# Patient Record
Sex: FEMALE | HISPANIC OR LATINO
[De-identification: names, ages, dates, MRNs, and addresses within clinical notes are randomized per-mention and may not be internally consistent; named-entity substitution may affect disease eponyms.]

---

## 2023-05-17 PROBLEM — Z00.00 ENCOUNTER FOR PREVENTIVE HEALTH EXAMINATION: Status: ACTIVE | Noted: 2023-05-17

## 2023-05-19 ENCOUNTER — APPOINTMENT (OUTPATIENT)
Dept: INTERNAL MEDICINE | Facility: CLINIC | Age: 53
End: 2023-05-19

## 2023-07-27 ENCOUNTER — NON-APPOINTMENT (OUTPATIENT)
Age: 53
End: 2023-07-27

## 2023-07-28 ENCOUNTER — NON-APPOINTMENT (OUTPATIENT)
Age: 53
End: 2023-07-28

## 2023-07-28 ENCOUNTER — APPOINTMENT (OUTPATIENT)
Dept: INTERNAL MEDICINE | Facility: CLINIC | Age: 53
End: 2023-07-28
Payer: COMMERCIAL

## 2023-07-28 VITALS
SYSTOLIC BLOOD PRESSURE: 120 MMHG | OXYGEN SATURATION: 98 % | DIASTOLIC BLOOD PRESSURE: 80 MMHG | HEIGHT: 64 IN | WEIGHT: 174 LBS | BODY MASS INDEX: 29.71 KG/M2 | TEMPERATURE: 98.3 F | HEART RATE: 92 BPM

## 2023-07-28 DIAGNOSIS — Z80.0 FAMILY HISTORY OF MALIGNANT NEOPLASM OF DIGESTIVE ORGANS: ICD-10-CM

## 2023-07-28 DIAGNOSIS — D28.1 BENIGN NEOPLASM OF VAGINA: ICD-10-CM

## 2023-07-28 DIAGNOSIS — Z78.9 OTHER SPECIFIED HEALTH STATUS: ICD-10-CM

## 2023-07-28 DIAGNOSIS — N93.9 ABNORMAL UTERINE AND VAGINAL BLEEDING, UNSPECIFIED: ICD-10-CM

## 2023-07-28 DIAGNOSIS — E66.3 OVERWEIGHT: ICD-10-CM

## 2023-07-28 DIAGNOSIS — D21.9 BENIGN NEOPLASM OF CONNECTIVE AND OTHER SOFT TISSUE, UNSPECIFIED: ICD-10-CM

## 2023-07-28 PROCEDURE — 99204 OFFICE O/P NEW MOD 45 MIN: CPT | Mod: 25

## 2023-07-28 PROCEDURE — 93000 ELECTROCARDIOGRAM COMPLETE: CPT

## 2023-07-28 RX ORDER — LIRAGLUTIDE 6 MG/ML
18 INJECTION, SOLUTION SUBCUTANEOUS
Qty: 5 | Refills: 0 | Status: ACTIVE | COMMUNITY
Start: 2023-07-28 | End: 1900-01-01

## 2023-07-31 PROBLEM — N93.9 VAGINAL BLEEDING: Status: ACTIVE | Noted: 2023-07-31

## 2023-07-31 PROBLEM — E66.3 OVERWEIGHT: Status: ACTIVE | Noted: 2023-07-28

## 2023-07-31 PROBLEM — D21.9 FIBROIDS: Status: ACTIVE | Noted: 2023-07-31

## 2023-07-31 PROBLEM — D28.1 VAGINAL FIBROIDS: Status: ACTIVE | Noted: 2023-07-31

## 2023-08-01 NOTE — HISTORY OF PRESENT ILLNESS
[FreeTextEntry1] : new patient establish care  [de-identified] : 1. awaiting procedure for thermal ablation of fibroid  2. patient needs an EKG for her pre op evaluation  3. patient also would like to restart her saxenda for weight loss. Patient is aware of the side effects of the medication. She reports she will only use it for 3 months maximum to achieve her desired weight loss.

## 2023-08-01 NOTE — HEALTH RISK ASSESSMENT
[Good] : ~his/her~  mood as  good [Yes] : Yes [Monthly or less (1 pt)] : Monthly or less (1 point) [1 or 2 (0 pts)] : 1 or 2 (0 points) [Never (0 pts)] : Never (0 points) [No] : In the past 12 months have you used drugs other than those required for medical reasons? No [0] : 2) Feeling down, depressed, or hopeless: Not at all (0) [Patient declined bone density test] : Patient declined bone density test [None] : None [Alone] : lives alone [Employed] : employed [College] : College [Single] : single [Feels Safe at Home] : Feels safe at home [Never] : Never [PHQ-2 Positive] : PHQ-2 Positive [Smoke Detector] : smoke detector [Safety elements used in home] : safety elements used in home [Seat Belt] :  uses seat belt [Sunscreen] : uses sunscreen [[id number]] : 0 [Change in mental status noted] : No change in mental status noted [Sexually Active] : not sexually active [High Risk Behavior] : no high risk behavior [MammogramDate] : 01/20 [MammogramComments] : pt can't remember  [PapSmearComments] : pt can't remember  [ColonoscopyDate] : 01/21 [ColonoscopyComments] : pt can't remember  [FreeTextEntry2] : Administrate

## 2023-08-01 NOTE — PLAN
[FreeTextEntry1] : Patient is aware of the risks and benefits of the surgery , information was given by PCP and surgeon, it is with this knowledge patient would like to proceed. patient EKG Is normal and CBC is within stable range. NO other acute complaints   patient is medically cleared for surgery

## 2023-11-29 RX ORDER — SEMAGLUTIDE 0.25 MG/.5ML
0.25 INJECTION, SOLUTION SUBCUTANEOUS
Qty: 4 | Refills: 0 | Status: ACTIVE | COMMUNITY
Start: 2023-11-29 | End: 1900-01-01

## 2024-04-04 RX ORDER — TIRZEPATIDE 2.5 MG/.5ML
2.5 INJECTION, SOLUTION SUBCUTANEOUS
Qty: 4 | Refills: 0 | Status: ACTIVE | COMMUNITY
Start: 2024-04-04 | End: 1900-01-01

## 2024-05-24 DIAGNOSIS — J45.30 MILD PERSISTENT ASTHMA, UNCOMPLICATED: ICD-10-CM

## 2024-05-25 RX ORDER — ALBUTEROL SULFATE 90 UG/1
108 (90 BASE) INHALANT RESPIRATORY (INHALATION) EVERY 6 HOURS
Qty: 1 | Refills: 1 | Status: ACTIVE | COMMUNITY
Start: 2024-05-24 | End: 1900-01-01

## 2024-08-22 ENCOUNTER — APPOINTMENT (OUTPATIENT)
Dept: FAMILY MEDICINE | Facility: CLINIC | Age: 54
End: 2024-08-22
Payer: COMMERCIAL

## 2024-08-22 VITALS
DIASTOLIC BLOOD PRESSURE: 78 MMHG | RESPIRATION RATE: 16 BRPM | HEART RATE: 82 BPM | SYSTOLIC BLOOD PRESSURE: 124 MMHG | OXYGEN SATURATION: 98 % | WEIGHT: 178 LBS | HEIGHT: 65 IN | BODY MASS INDEX: 29.66 KG/M2

## 2024-08-22 DIAGNOSIS — Z12.39 ENCOUNTER FOR OTHER SCREENING FOR MALIGNANT NEOPLASM OF BREAST: ICD-10-CM

## 2024-08-22 DIAGNOSIS — M79.645 PAIN IN LEFT FINGER(S): ICD-10-CM

## 2024-08-22 DIAGNOSIS — Z00.00 ENCOUNTER FOR GENERAL ADULT MEDICAL EXAMINATION W/OUT ABNORMAL FINDINGS: ICD-10-CM

## 2024-08-22 DIAGNOSIS — Z86.018 PERSONAL HISTORY OF OTHER BENIGN NEOPLASM: ICD-10-CM

## 2024-08-22 DIAGNOSIS — E66.3 OVERWEIGHT: ICD-10-CM

## 2024-08-22 DIAGNOSIS — G43.909 MIGRAINE, UNSPECIFIED, NOT INTRACTABLE, W/OUT STATUS MIGRAINOSUS: ICD-10-CM

## 2024-08-22 DIAGNOSIS — Z87.42 PERSONAL HISTORY OF OTHER DISEASES OF THE FEMALE GENITAL TRACT: ICD-10-CM

## 2024-08-22 PROCEDURE — 36415 COLL VENOUS BLD VENIPUNCTURE: CPT

## 2024-08-22 PROCEDURE — 99386 PREV VISIT NEW AGE 40-64: CPT

## 2024-08-22 RX ORDER — SUMATRIPTAN 50 MG/1
50 TABLET, FILM COATED ORAL
Qty: 20 | Refills: 0 | Status: ACTIVE | COMMUNITY
Start: 2024-08-22 | End: 1900-01-01

## 2024-08-23 DIAGNOSIS — E66.9 OBESITY, UNSPECIFIED: ICD-10-CM

## 2024-08-23 LAB
25(OH)D3 SERPL-MCNC: 30.6 NG/ML
ALBUMIN SERPL ELPH-MCNC: 4.7 G/DL
ALP BLD-CCNC: 77 U/L
ALT SERPL-CCNC: 20 U/L
ANION GAP SERPL CALC-SCNC: 11 MMOL/L
AST SERPL-CCNC: 20 U/L
BILIRUB SERPL-MCNC: 1.5 MG/DL
BUN SERPL-MCNC: 9 MG/DL
CALCIUM SERPL-MCNC: 9.4 MG/DL
CHLORIDE SERPL-SCNC: 103 MMOL/L
CHOLEST SERPL-MCNC: 240 MG/DL
CO2 SERPL-SCNC: 26 MMOL/L
CREAT SERPL-MCNC: 0.94 MG/DL
EGFR: 73 ML/MIN/1.73M2
ESTIMATED AVERAGE GLUCOSE: 88 MG/DL
FOLATE SERPL-MCNC: 4 NG/ML
GLUCOSE SERPL-MCNC: 89 MG/DL
HBA1C MFR BLD HPLC: 4.7 %
HCT VFR BLD CALC: 49.2 %
HDLC SERPL-MCNC: 53 MG/DL
HGB BLD-MCNC: 16.6 G/DL
LDLC SERPL CALC-MCNC: 158 MG/DL
MCHC RBC-ENTMCNC: 31 PG
MCHC RBC-ENTMCNC: 33.7 GM/DL
MCV RBC AUTO: 91.8 FL
NONHDLC SERPL-MCNC: 188 MG/DL
PLATELET # BLD AUTO: 265 K/UL
POTASSIUM SERPL-SCNC: 4.6 MMOL/L
PROT SERPL-MCNC: 7.1 G/DL
RBC # BLD: 5.36 M/UL
RBC # FLD: 13.2 %
SODIUM SERPL-SCNC: 140 MMOL/L
TRIGL SERPL-MCNC: 165 MG/DL
TSH SERPL-ACNC: 0.75 UIU/ML
VIT B12 SERPL-MCNC: 1128 PG/ML
WBC # FLD AUTO: 7.8 K/UL

## 2024-08-23 RX ORDER — PHENTERMINE AND TOPIRAMATE 3.75; 23 MG/1; MG/1
3.75-23 CAPSULE, EXTENDED RELEASE ORAL
Qty: 14 | Refills: 0 | Status: ACTIVE | COMMUNITY
Start: 2024-08-23 | End: 1900-01-01

## 2024-08-23 NOTE — HISTORY OF PRESENT ILLNESS
[FreeTextEntry1] : Annual [de-identified] : Ms. KAYLA KUMAR is a 53 year old female here today for annual Currently having a migraine ( left sided) headache. Started several days ago.Taking Excedrin but not helping enough.  Mammo: a few years ago UTD with colonoscopy TdaP w/i 10 yrs. HX of likely TMJ- clenches her jaw.  c/o pain in her left index finger for 2 months- saw ortho and neuro - no diagnoses were made.

## 2024-08-23 NOTE — ADDENDUM
[FreeTextEntry1] : 8/23/24- Spoke to patient. After taking sumatriptan her headache dramatically improved. Spoke to her about her labs. Discussed lower fat and carbs to help reduce her LDL chol and trig Her RBC and H/H were mildly elevated- pt admits to vaping. Likely the cause so pt advised to wean off of vape. Discussed her weight and decided Qysmia is a good option given her hx of migraines. Will try to put through insurance . If she starts will work through graduated dosing and follow response.

## 2024-08-23 NOTE — HEALTH RISK ASSESSMENT
[Yes] : Yes [Never (0 pts)] : Never (0 points) [1] : 2) Feeling down, depressed, or hopeless for several days (1) [Never] : Never [Patient reported mammogram was normal] : Patient reported mammogram was normal [Patient reported PAP Smear was normal] : Patient reported PAP Smear was normal [Patient reported colonoscopy was normal] : Patient reported colonoscopy was normal [No falls in past year] : Patient reported no falls in the past year [No Retinopathy] : No retinopathy [de-identified] : not exercising currently [de-identified] : improved [de-identified] : yes but doesn't remember the date  [EyeExamDate] : 06/24 [MammogramDate] : 01/22 [PapSmearDate] : 01/23 [ColonoscopyDate] : 01/21

## 2024-08-23 NOTE — PHYSICAL EXAM
[No Acute Distress] : no acute distress [Well Nourished] : well nourished [Clear to Auscultation] : lungs were clear to auscultation bilaterally [No Edema] : there was no peripheral edema [Normal Oropharynx] : the oropharynx was normal [Normal TMs] : both tympanic membranes were normal [Normal] : affect was normal and insight and judgment were intact [de-identified] : cervical tension [de-identified] : Mild angling of her left index finger.

## 2024-08-23 NOTE — REVIEW OF SYSTEMS
[Fatigue] : fatigue [Negative] : Integumentary [Vision Problems] : no vision problems [FreeTextEntry2] : Lost weight [FreeTextEntry3] : Last eye exam 2 months ago [FreeTextEntry9] : neck stiffness; left index finger pain for 2 months [de-identified] : sleep varies

## 2024-08-23 NOTE — PHYSICAL EXAM
[No Acute Distress] : no acute distress [Well Nourished] : well nourished [Clear to Auscultation] : lungs were clear to auscultation bilaterally [No Edema] : there was no peripheral edema [Normal Oropharynx] : the oropharynx was normal [Normal TMs] : both tympanic membranes were normal [Normal] : affect was normal and insight and judgment were intact [de-identified] : cervical tension [de-identified] : Mild angling of her left index finger.

## 2024-08-23 NOTE — REVIEW OF SYSTEMS
[Fatigue] : fatigue [Negative] : Integumentary [Vision Problems] : no vision problems [FreeTextEntry2] : Lost weight [FreeTextEntry3] : Last eye exam 2 months ago [FreeTextEntry9] : neck stiffness; left index finger pain for 2 months [de-identified] : sleep varies

## 2024-08-23 NOTE — HISTORY OF PRESENT ILLNESS
[FreeTextEntry1] : Annual [de-identified] : Ms. KAYLA KUMAR is a 53 year old female here today for annual Currently having a migraine ( left sided) headache. Started several days ago.Taking Excedrin but not helping enough.  Mammo: a few years ago UTD with colonoscopy TdaP w/i 10 yrs. HX of likely TMJ- clenches her jaw.  c/o pain in her left index finger for 2 months- saw ortho and neuro - no diagnoses were made.

## 2024-08-23 NOTE — HEALTH RISK ASSESSMENT
[Yes] : Yes [Never (0 pts)] : Never (0 points) [1] : 2) Feeling down, depressed, or hopeless for several days (1) [Never] : Never [Patient reported mammogram was normal] : Patient reported mammogram was normal [Patient reported PAP Smear was normal] : Patient reported PAP Smear was normal [Patient reported colonoscopy was normal] : Patient reported colonoscopy was normal [No falls in past year] : Patient reported no falls in the past year [No Retinopathy] : No retinopathy [de-identified] : not exercising currently [de-identified] : improved [de-identified] : yes but doesn't remember the date  [EyeExamDate] : 06/24 [MammogramDate] : 01/22 [PapSmearDate] : 01/23 [ColonoscopyDate] : 01/21

## 2024-09-17 ENCOUNTER — APPOINTMENT (OUTPATIENT)
Dept: FAMILY MEDICINE | Facility: CLINIC | Age: 54
End: 2024-09-17
Payer: COMMERCIAL

## 2024-09-17 VITALS
WEIGHT: 183 LBS | DIASTOLIC BLOOD PRESSURE: 78 MMHG | OXYGEN SATURATION: 99 % | HEART RATE: 94 BPM | RESPIRATION RATE: 16 BRPM | HEIGHT: 65 IN | BODY MASS INDEX: 30.49 KG/M2 | SYSTOLIC BLOOD PRESSURE: 118 MMHG

## 2024-09-17 DIAGNOSIS — G43.909 MIGRAINE, UNSPECIFIED, NOT INTRACTABLE, W/OUT STATUS MIGRAINOSUS: ICD-10-CM

## 2024-09-17 DIAGNOSIS — Z23 ENCOUNTER FOR IMMUNIZATION: ICD-10-CM

## 2024-09-17 DIAGNOSIS — E66.3 OVERWEIGHT: ICD-10-CM

## 2024-09-17 DIAGNOSIS — E66.9 OBESITY, UNSPECIFIED: ICD-10-CM

## 2024-09-17 PROCEDURE — 90471 IMMUNIZATION ADMIN: CPT

## 2024-09-17 PROCEDURE — 99213 OFFICE O/P EST LOW 20 MIN: CPT | Mod: 25

## 2024-09-17 PROCEDURE — 90715 TDAP VACCINE 7 YRS/> IM: CPT

## 2024-09-17 RX ORDER — PHENTERMINE AND TOPIRAMATE 7.5; 46 MG/1; MG/1
7.5-46 CAPSULE, EXTENDED RELEASE ORAL DAILY
Qty: 30 | Refills: 0 | Status: ACTIVE | COMMUNITY
Start: 2024-09-17 | End: 1900-01-01

## 2024-09-17 NOTE — HISTORY OF PRESENT ILLNESS
[FreeTextEntry1] : F/U on Qysmia/ Blood pressure check. TdaP booster [de-identified] : Ms. KAYLA KUMAR is a 53 year old female here today for follow-up on Qysmia for weight loss. Tolerating dose. No noticeable appetite suppression or weight loss. No other SE. NO headaches since starting medication.

## 2024-09-17 NOTE — HEALTH RISK ASSESSMENT
[No] : No [Any fall with injury in past year] : Patient reported fall with injury in the past year [3] : 1) Little interest or pleasure doing things for nearly every day (3) [0] : 2) Feeling down, depressed, or hopeless: Not at all (0) [de-identified] : dianna

## 2024-09-17 NOTE — REVIEW OF SYSTEMS
[Negative] : Psychiatric [Recent Change In Weight] : ~T no recent weight change [Headache] : no headache

## 2024-09-18 ENCOUNTER — RX RENEWAL (OUTPATIENT)
Age: 54
End: 2024-09-18

## 2024-10-14 ENCOUNTER — RX RENEWAL (OUTPATIENT)
Age: 54
End: 2024-10-14

## 2024-12-04 DIAGNOSIS — E66.811 OBESITY, CLASS 1: ICD-10-CM

## 2024-12-04 RX ORDER — TOPIRAMATE 25 MG/1
25 TABLET, FILM COATED ORAL
Qty: 60 | Refills: 3 | Status: ACTIVE | COMMUNITY
Start: 2024-12-04 | End: 1900-01-01

## 2024-12-04 RX ORDER — TIRZEPATIDE 2.5 MG/.5ML
2.5 INJECTION, SOLUTION SUBCUTANEOUS
Qty: 4 | Refills: 0 | Status: ACTIVE | COMMUNITY
Start: 2024-12-04 | End: 1900-01-01

## 2024-12-09 DIAGNOSIS — U07.1 COVID-19: ICD-10-CM

## 2024-12-09 RX ORDER — PREDNISONE 20 MG/1
20 TABLET ORAL
Qty: 10 | Refills: 0 | Status: ACTIVE | COMMUNITY
Start: 2024-12-09 | End: 1900-01-01

## 2025-02-25 ENCOUNTER — RX RENEWAL (OUTPATIENT)
Age: 55
End: 2025-02-25

## 2025-04-15 ENCOUNTER — NON-APPOINTMENT (OUTPATIENT)
Age: 55
End: 2025-04-15

## 2025-08-27 ENCOUNTER — NON-APPOINTMENT (OUTPATIENT)
Age: 55
End: 2025-08-27

## 2025-08-29 ENCOUNTER — APPOINTMENT (OUTPATIENT)
Dept: FAMILY MEDICINE | Facility: CLINIC | Age: 55
End: 2025-08-29
Payer: COMMERCIAL

## 2025-08-29 VITALS
OXYGEN SATURATION: 99 % | DIASTOLIC BLOOD PRESSURE: 75 MMHG | SYSTOLIC BLOOD PRESSURE: 114 MMHG | HEIGHT: 65 IN | RESPIRATION RATE: 16 BRPM | BODY MASS INDEX: 26.49 KG/M2 | WEIGHT: 159 LBS | HEART RATE: 89 BPM | TEMPERATURE: 98.2 F

## 2025-08-29 DIAGNOSIS — E53.8 DEFICIENCY OF OTHER SPECIFIED B GROUP VITAMINS: ICD-10-CM

## 2025-08-29 DIAGNOSIS — R92.343 MAMMOGRAPHIC EXTREME DENSITY, BILATERAL BREASTS: ICD-10-CM

## 2025-08-29 DIAGNOSIS — Z12.39 ENCOUNTER FOR OTHER SCREENING FOR MALIGNANT NEOPLASM OF BREAST: ICD-10-CM

## 2025-08-29 DIAGNOSIS — F17.290 NICOTINE DEPENDENCE, OTHER TOBACCO PRODUCT, UNCOMPLICATED: ICD-10-CM

## 2025-08-29 DIAGNOSIS — Z00.00 ENCOUNTER FOR GENERAL ADULT MEDICAL EXAMINATION W/OUT ABNORMAL FINDINGS: ICD-10-CM

## 2025-08-29 DIAGNOSIS — U07.1 COVID-19: ICD-10-CM

## 2025-08-29 DIAGNOSIS — D75.1 SECONDARY POLYCYTHEMIA: ICD-10-CM

## 2025-08-29 DIAGNOSIS — E78.00 PURE HYPERCHOLESTEROLEMIA, UNSPECIFIED: ICD-10-CM

## 2025-08-29 DIAGNOSIS — M79.645 PAIN IN LEFT FINGER(S): ICD-10-CM

## 2025-08-29 DIAGNOSIS — E66.811 OBESITY, CLASS 1: ICD-10-CM

## 2025-08-29 PROCEDURE — 36415 COLL VENOUS BLD VENIPUNCTURE: CPT

## 2025-08-29 PROCEDURE — 99396 PREV VISIT EST AGE 40-64: CPT

## 2025-09-03 ENCOUNTER — RX CHANGE (OUTPATIENT)
Age: 55
End: 2025-09-03

## 2025-09-03 RX ORDER — ALBUTEROL SULFATE 90 UG/1
108 (90 BASE) POWDER, METERED RESPIRATORY (INHALATION)
Qty: 1 | Refills: 3 | Status: ACTIVE | COMMUNITY
Start: 1900-01-01 | End: 1900-01-01

## 2025-09-07 LAB
ALBUMIN SERPL ELPH-MCNC: 4.7 G/DL
ALP BLD-CCNC: 69 U/L
ALT SERPL-CCNC: 21 U/L
ANION GAP SERPL CALC-SCNC: 13 MMOL/L
AST SERPL-CCNC: 17 U/L
BASOPHILS # BLD AUTO: 0.06 K/UL
BASOPHILS NFR BLD AUTO: 0.8 %
BILIRUB SERPL-MCNC: 0.9 MG/DL
BUN SERPL-MCNC: 12 MG/DL
CALCIUM SERPL-MCNC: 9.6 MG/DL
CHLORIDE SERPL-SCNC: 102 MMOL/L
CHOLEST SERPL-MCNC: 238 MG/DL
CO2 SERPL-SCNC: 23 MMOL/L
CREAT SERPL-MCNC: 1 MG/DL
EGFRCR SERPLBLD CKD-EPI 2021: 67 ML/MIN/1.73M2
EOSINOPHIL # BLD AUTO: 0.14 K/UL
EOSINOPHIL NFR BLD AUTO: 1.9 %
FOLATE SERPL-MCNC: 10.6 NG/ML
GLUCOSE SERPL-MCNC: 86 MG/DL
HCT VFR BLD CALC: 50.7 %
HDLC SERPL-MCNC: 57 MG/DL
HGB BLD-MCNC: 16.7 G/DL
IMM GRANULOCYTES NFR BLD AUTO: 0.3 %
LDLC SERPL-MCNC: 155 MG/DL
LYMPHOCYTES # BLD AUTO: 1.49 K/UL
LYMPHOCYTES NFR BLD AUTO: 20.4 %
MAN DIFF?: NORMAL
MCHC RBC-ENTMCNC: 30.2 PG
MCHC RBC-ENTMCNC: 32.9 G/DL
MCV RBC AUTO: 91.7 FL
MONOCYTES # BLD AUTO: 0.39 K/UL
MONOCYTES NFR BLD AUTO: 5.3 %
NEUTROPHILS # BLD AUTO: 5.19 K/UL
NEUTROPHILS NFR BLD AUTO: 71.3 %
NONHDLC SERPL-MCNC: 181 MG/DL
PLATELET # BLD AUTO: 246 K/UL
POTASSIUM SERPL-SCNC: 4.7 MMOL/L
PROT SERPL-MCNC: 7.3 G/DL
RBC # BLD: 5.53 M/UL
RBC # FLD: 13.2 %
SODIUM SERPL-SCNC: 139 MMOL/L
TRIGL SERPL-MCNC: 147 MG/DL
WBC # FLD AUTO: 7.29 K/UL